# Patient Record
Sex: FEMALE | Race: WHITE
[De-identification: names, ages, dates, MRNs, and addresses within clinical notes are randomized per-mention and may not be internally consistent; named-entity substitution may affect disease eponyms.]

---

## 2017-02-05 NOTE — NUR
Patient given written and verbal discharge instructions and verbalizes 
understanding.  ER MD discussed with patient the results and treatment 
provided.  Patient in stable condition. ID arm band removed. IV catheter 
removed intact and dressing applied, no active bleeding.

Rx of TRAMADOL HCL 50 MG AMD FLAGYL 500 MG  given. Patient educated on pain 
management and to follow up with PMD. Pain Scale 0/10.

Opportunity for questions provided and answered.

## 2017-08-05 NOTE — NUR
Patient given written and verbal discharge instructions and verbalizes 
understanding.  ER MD discussed with patient the results and treatment 
provided. Patient in stable condition. ID arm band removed. IV catheter removed 
intact and dressing applied, no active bleeding.

Rx of Clonidine given. Patient educated on pain management and to follow up 
with PMD. Pain Scale 0/10.

Opportunity for questions provided and answered.

## 2017-08-05 NOTE — NUR
Pt AAOx4, ambulatory without assistance and steady gait. Pt states having 
chronic problem with blood pressure, states her blood pressure has not dropped 
over "last few days" despite taking medications for blood pressure prescribed 
by her PMD. Pt states having "slight blurry vision and having hot sensation 
throughout her body" for "several days prior" to ER visit. Pt denies any other 
complaints.

## 2018-09-24 NOTE — NUR
Pt presents to ED c/o tremors upon assessment. Pt has  no acute distress noted. 
/95. Pt seen in ED for elevated BP. Pt reports noncompliance w/ DM 
medication, abruptly stopping Klonopin and recent rotor cuff sx. Pt AAO X4 
appears anxious.Pt denies CP or SOB. Pt's tremors resolve with conversation.

## 2018-09-24 NOTE — NUR
Patient given written and verbal discharge instructions and verbalizes 
understanding.  ER MD discussed with patient the results and treatment 
provided. Patient in stable condition. ID arm band 

No Rx of given. Patient educated on pain management and to follow up with PMD. 
Pain Scale 0.

Opportunity for questions provided and answered. Medication side effect fact 
sheet provided. Dr. Martin aware of discharge BP. Pt advised to take home RX 
for HTN.

## 2019-06-03 NOTE — NUR
Pt AAOx4 ambulated into ED c/o 10/10 LLQ abd pain radiating to RLQ abd, nausea, 
vomiting, diarrhea since last night with new onset bright red rectal bleeding. 
Last oral intake was at lunch yesterday. No other injuries/complaints per 
pt/noted. Will continue to monitor.

## 2019-12-13 NOTE — NUR
Patient triaged and placed in waiting room. VSS and patient appears in no acute 
distress at this time. Accompanied by , awaiting available bed, and MD 
notified of need for MSE.

## 2019-12-13 NOTE — NUR
Patient given written and verbal discharge instructions and verbalizes 
understanding.  ER MD discussed with patient the results and treatment 
provided. Patient in stable condition. ID arm band removed. 

Patient educated on pain management and to follow up with PMD. Pain Scale 0/10.

Opportunity for questions provided and answered. Medication side effect fact 
sheet provided.

## 2020-03-15 ENCOUNTER — HOSPITAL ENCOUNTER (EMERGENCY)
Dept: HOSPITAL 4 - SED | Age: 70
Discharge: HOME | End: 2020-03-15
Payer: COMMERCIAL

## 2020-03-15 VITALS — HEIGHT: 66 IN | BODY MASS INDEX: 28.45 KG/M2 | SYSTOLIC BLOOD PRESSURE: 156 MMHG | WEIGHT: 177 LBS

## 2020-03-15 VITALS — SYSTOLIC BLOOD PRESSURE: 133 MMHG

## 2020-03-15 DIAGNOSIS — K57.92: Primary | ICD-10-CM

## 2020-03-15 DIAGNOSIS — K21.9: ICD-10-CM

## 2020-03-15 DIAGNOSIS — I10: ICD-10-CM

## 2020-03-15 DIAGNOSIS — E07.9: ICD-10-CM

## 2020-03-15 DIAGNOSIS — E11.9: ICD-10-CM

## 2020-03-15 DIAGNOSIS — Z88.2: ICD-10-CM

## 2020-03-15 DIAGNOSIS — Z88.0: ICD-10-CM

## 2020-03-15 DIAGNOSIS — Z79.82: ICD-10-CM

## 2020-03-15 DIAGNOSIS — Z79.899: ICD-10-CM

## 2020-03-15 DIAGNOSIS — Z88.1: ICD-10-CM

## 2020-03-15 LAB
ALBUMIN SERPL BCP-MCNC: 3.3 G/DL (ref 3.4–4.8)
ALT SERPL W P-5'-P-CCNC: 21 U/L (ref 12–78)
ANION GAP SERPL CALCULATED.3IONS-SCNC: 12 MMOL/L (ref 5–15)
APPEARANCE UR: CLEAR
AST SERPL W P-5'-P-CCNC: 12 U/L (ref 10–37)
BASOPHILS # BLD AUTO: 0 K/UL (ref 0–0.2)
BASOPHILS NFR BLD AUTO: 0.5 % (ref 0–2)
BILIRUB SERPL-MCNC: 0.5 MG/DL (ref 0–1)
BILIRUB UR QL STRIP: NEGATIVE
BUN SERPL-MCNC: 12 MG/DL (ref 8–21)
CALCIUM SERPL-MCNC: 9.2 MG/DL (ref 8.4–11)
CHLORIDE SERPL-SCNC: 101 MMOL/L (ref 98–107)
COLOR UR: YELLOW
CREAT SERPL-MCNC: 1 MG/DL (ref 0.55–1.3)
EOSINOPHIL # BLD AUTO: 0.1 K/UL (ref 0–0.4)
EOSINOPHIL NFR BLD AUTO: 0.8 % (ref 0–4)
ERYTHROCYTE [DISTWIDTH] IN BLOOD BY AUTOMATED COUNT: 13.2 % (ref 9–15)
GFR SERPL CREATININE-BSD FRML MDRD: 70 ML/MIN (ref 90–?)
GLUCOSE SERPL-MCNC: 225 MG/DL (ref 70–99)
GLUCOSE UR STRIP-MCNC: (no result) MG/DL
HCT VFR BLD AUTO: 40.6 % (ref 36–48)
HGB BLD-MCNC: 13.7 G/DL (ref 12–16)
HGB UR QL STRIP: NEGATIVE
KETONES UR STRIP-MCNC: NEGATIVE MG/DL
LEUKOCYTE ESTERASE UR QL STRIP: NEGATIVE
LIPASE SERPL-CCNC: 54 U/L (ref 73–393)
LYMPHOCYTES # BLD AUTO: 1 K/UL (ref 1–5.5)
LYMPHOCYTES NFR BLD AUTO: 11.6 % (ref 20.5–51.5)
MCH RBC QN AUTO: 30 PG (ref 27–31)
MCHC RBC AUTO-ENTMCNC: 34 % (ref 32–36)
MCV RBC AUTO: 88 FL (ref 79–98)
MONOCYTES # BLD MANUAL: 0.6 K/UL (ref 0–1)
MONOCYTES # BLD MANUAL: 6.7 % (ref 1.7–9.3)
NEUTROPHILS # BLD AUTO: 6.8 K/UL (ref 1.8–7.7)
NEUTROPHILS NFR BLD AUTO: 80.4 % (ref 40–70)
NITRITE UR QL STRIP: NEGATIVE
PH UR STRIP: 5 [PH] (ref 5–8)
PLATELET # BLD AUTO: 212 K/UL (ref 130–430)
POTASSIUM SERPL-SCNC: 3.5 MMOL/L (ref 3.5–5.1)
PROT UR QL STRIP: NEGATIVE
RBC # BLD AUTO: 4.61 MIL/UL (ref 4.2–6.2)
SODIUM SERPLBLD-SCNC: 138 MMOL/L (ref 136–145)
SP GR UR STRIP: 1.02 (ref 1–1.03)
UROBILINOGEN UR STRIP-MCNC: 0.2 MG/DL (ref 0.2–1)
WBC # BLD AUTO: 8.5 K/UL (ref 4.8–10.8)

## 2020-03-15 PROCEDURE — 96375 TX/PRO/DX INJ NEW DRUG ADDON: CPT

## 2020-03-15 PROCEDURE — 36415 COLL VENOUS BLD VENIPUNCTURE: CPT

## 2020-03-15 PROCEDURE — 99285 EMERGENCY DEPT VISIT HI MDM: CPT

## 2020-03-15 PROCEDURE — 81003 URINALYSIS AUTO W/O SCOPE: CPT

## 2020-03-15 PROCEDURE — 93005 ELECTROCARDIOGRAM TRACING: CPT

## 2020-03-15 PROCEDURE — 85025 COMPLETE CBC W/AUTO DIFF WBC: CPT

## 2020-03-15 PROCEDURE — 71045 X-RAY EXAM CHEST 1 VIEW: CPT

## 2020-03-15 PROCEDURE — 80053 COMPREHEN METABOLIC PANEL: CPT

## 2020-03-15 PROCEDURE — 83690 ASSAY OF LIPASE: CPT

## 2020-03-15 PROCEDURE — 74176 CT ABD & PELVIS W/O CONTRAST: CPT

## 2020-03-15 PROCEDURE — 96365 THER/PROPH/DIAG IV INF INIT: CPT

## 2020-04-03 ENCOUNTER — HOSPITAL ENCOUNTER (EMERGENCY)
Dept: HOSPITAL 4 - SED | Age: 70
Discharge: HOME | End: 2020-04-03
Payer: COMMERCIAL

## 2020-04-03 VITALS — HEIGHT: 66 IN | BODY MASS INDEX: 27.32 KG/M2 | WEIGHT: 170 LBS

## 2020-04-03 VITALS — SYSTOLIC BLOOD PRESSURE: 205 MMHG

## 2020-04-03 VITALS — SYSTOLIC BLOOD PRESSURE: 127 MMHG

## 2020-04-03 DIAGNOSIS — E07.9: ICD-10-CM

## 2020-04-03 DIAGNOSIS — Z88.0: ICD-10-CM

## 2020-04-03 DIAGNOSIS — I10: Primary | ICD-10-CM

## 2020-04-03 DIAGNOSIS — F41.9: ICD-10-CM

## 2020-04-03 DIAGNOSIS — E11.9: ICD-10-CM

## 2020-04-03 DIAGNOSIS — Z90.710: ICD-10-CM

## 2020-04-03 DIAGNOSIS — Z79.899: ICD-10-CM

## 2020-04-03 DIAGNOSIS — Z79.82: ICD-10-CM

## 2020-04-03 DIAGNOSIS — Z90.49: ICD-10-CM

## 2020-04-03 DIAGNOSIS — Z88.2: ICD-10-CM

## 2020-04-03 DIAGNOSIS — R51: ICD-10-CM

## 2020-04-03 DIAGNOSIS — K21.9: ICD-10-CM

## 2020-04-03 DIAGNOSIS — Z88.1: ICD-10-CM

## 2020-04-03 LAB
ALBUMIN SERPL BCP-MCNC: 3 G/DL (ref 3.4–4.8)
ALT SERPL W P-5'-P-CCNC: 17 U/L (ref 12–78)
ANION GAP SERPL CALCULATED.3IONS-SCNC: 10 MMOL/L (ref 5–15)
AST SERPL W P-5'-P-CCNC: 7 U/L (ref 10–37)
BASOPHILS # BLD AUTO: 0.1 K/UL (ref 0–0.2)
BASOPHILS NFR BLD AUTO: 1 % (ref 0–2)
BILIRUB SERPL-MCNC: 0.2 MG/DL (ref 0–1)
BUN SERPL-MCNC: 14 MG/DL (ref 8–21)
CALCIUM SERPL-MCNC: 8 MG/DL (ref 8.4–11)
CHLORIDE SERPL-SCNC: 104 MMOL/L (ref 98–107)
CREAT SERPL-MCNC: 0.94 MG/DL (ref 0.55–1.3)
EOSINOPHIL # BLD AUTO: 0.1 K/UL (ref 0–0.4)
EOSINOPHIL NFR BLD AUTO: 1.3 % (ref 0–4)
ERYTHROCYTE [DISTWIDTH] IN BLOOD BY AUTOMATED COUNT: 13.4 % (ref 9–15)
GFR SERPL CREATININE-BSD FRML MDRD: 76 ML/MIN (ref 90–?)
GLUCOSE SERPL-MCNC: 282 MG/DL (ref 70–99)
HCT VFR BLD AUTO: 37.2 % (ref 36–48)
HGB BLD-MCNC: 12.5 G/DL (ref 12–16)
LYMPHOCYTES # BLD AUTO: 1.2 K/UL (ref 1–5.5)
LYMPHOCYTES NFR BLD AUTO: 18.9 % (ref 20.5–51.5)
MCH RBC QN AUTO: 30 PG (ref 27–31)
MCHC RBC AUTO-ENTMCNC: 34 % (ref 32–36)
MCV RBC AUTO: 89 FL (ref 79–98)
MONOCYTES # BLD MANUAL: 0.5 K/UL (ref 0–1)
MONOCYTES # BLD MANUAL: 8.1 % (ref 1.7–9.3)
NEUTROPHILS # BLD AUTO: 4.5 K/UL (ref 1.8–7.7)
NEUTROPHILS NFR BLD AUTO: 70.7 % (ref 40–70)
PLATELET # BLD AUTO: 278 K/UL (ref 130–430)
POTASSIUM SERPL-SCNC: 3.7 MMOL/L (ref 3.5–5.1)
RBC # BLD AUTO: 4.2 MIL/UL (ref 4.2–6.2)
SODIUM SERPLBLD-SCNC: 138 MMOL/L (ref 136–145)
WBC # BLD AUTO: 6.3 K/UL (ref 4.8–10.8)

## 2020-04-03 PROCEDURE — 80053 COMPREHEN METABOLIC PANEL: CPT

## 2020-04-03 PROCEDURE — 82962 GLUCOSE BLOOD TEST: CPT

## 2020-04-03 PROCEDURE — 96374 THER/PROPH/DIAG INJ IV PUSH: CPT

## 2020-04-03 PROCEDURE — 84484 ASSAY OF TROPONIN QUANT: CPT

## 2020-04-03 PROCEDURE — 93005 ELECTROCARDIOGRAM TRACING: CPT

## 2020-04-03 PROCEDURE — 96375 TX/PRO/DX INJ NEW DRUG ADDON: CPT

## 2020-04-03 PROCEDURE — 70450 CT HEAD/BRAIN W/O DYE: CPT

## 2020-04-03 PROCEDURE — 85025 COMPLETE CBC W/AUTO DIFF WBC: CPT

## 2020-04-03 PROCEDURE — 99285 EMERGENCY DEPT VISIT HI MDM: CPT

## 2020-04-03 PROCEDURE — 36415 COLL VENOUS BLD VENIPUNCTURE: CPT

## 2020-04-04 ENCOUNTER — HOSPITAL ENCOUNTER (EMERGENCY)
Dept: HOSPITAL 4 - SED | Age: 70
Discharge: HOME | End: 2020-04-04
Payer: COMMERCIAL

## 2020-04-04 VITALS — WEIGHT: 175 LBS | BODY MASS INDEX: 28.12 KG/M2 | SYSTOLIC BLOOD PRESSURE: 171 MMHG | HEIGHT: 66 IN

## 2020-04-04 VITALS — SYSTOLIC BLOOD PRESSURE: 156 MMHG

## 2020-04-04 DIAGNOSIS — Z79.899: ICD-10-CM

## 2020-04-04 DIAGNOSIS — E11.9: ICD-10-CM

## 2020-04-04 DIAGNOSIS — Z79.4: ICD-10-CM

## 2020-04-04 DIAGNOSIS — K21.9: ICD-10-CM

## 2020-04-04 DIAGNOSIS — R11.0: ICD-10-CM

## 2020-04-04 DIAGNOSIS — I10: ICD-10-CM

## 2020-04-04 DIAGNOSIS — Z88.2: ICD-10-CM

## 2020-04-04 DIAGNOSIS — R51: Primary | ICD-10-CM

## 2020-04-04 DIAGNOSIS — Z88.0: ICD-10-CM

## 2020-04-04 DIAGNOSIS — Z88.1: ICD-10-CM

## 2020-04-04 LAB
ALBUMIN SERPL BCP-MCNC: 3.6 G/DL (ref 3.4–4.8)
ALT SERPL W P-5'-P-CCNC: 20 U/L (ref 12–78)
ANION GAP SERPL CALCULATED.3IONS-SCNC: 11 MMOL/L (ref 5–15)
APPEARANCE UR: CLEAR
AST SERPL W P-5'-P-CCNC: 12 U/L (ref 10–37)
BACTERIA URNS QL MICRO: (no result) /HPF
BASOPHILS # BLD AUTO: 0.1 K/UL (ref 0–0.2)
BASOPHILS NFR BLD AUTO: 0.6 % (ref 0–2)
BILIRUB SERPL-MCNC: 0.4 MG/DL (ref 0–1)
BILIRUB UR QL STRIP: NEGATIVE
BUN SERPL-MCNC: 14 MG/DL (ref 8–21)
CALCIUM SERPL-MCNC: 9.3 MG/DL (ref 8.4–11)
CHLORIDE SERPL-SCNC: 96 MMOL/L (ref 98–107)
COLOR UR: YELLOW
CREAT SERPL-MCNC: 0.94 MG/DL (ref 0.55–1.3)
EOSINOPHIL # BLD AUTO: 0.1 K/UL (ref 0–0.4)
EOSINOPHIL NFR BLD AUTO: 1.1 % (ref 0–4)
ERYTHROCYTE [DISTWIDTH] IN BLOOD BY AUTOMATED COUNT: 13.4 % (ref 9–15)
GFR SERPL CREATININE-BSD FRML MDRD: 76 ML/MIN (ref 90–?)
GLUCOSE SERPL-MCNC: 269 MG/DL (ref 70–99)
GLUCOSE UR STRIP-MCNC: (no result) MG/DL
HCT VFR BLD AUTO: 41.4 % (ref 36–48)
HGB BLD-MCNC: 14 G/DL (ref 12–16)
HGB UR QL STRIP: NEGATIVE
INR PPP: 0.9 (ref 0.8–1.2)
KETONES UR STRIP-MCNC: NEGATIVE MG/DL
LEUKOCYTE ESTERASE UR QL STRIP: NEGATIVE
LYMPHOCYTES # BLD AUTO: 1.3 K/UL (ref 1–5.5)
LYMPHOCYTES NFR BLD AUTO: 12.2 % (ref 20.5–51.5)
MCH RBC QN AUTO: 30 PG (ref 27–31)
MCHC RBC AUTO-ENTMCNC: 34 % (ref 32–36)
MCV RBC AUTO: 88 FL (ref 79–98)
MONOCYTES # BLD MANUAL: 0.8 K/UL (ref 0–1)
MONOCYTES # BLD MANUAL: 7.6 % (ref 1.7–9.3)
NEUTROPHILS # BLD AUTO: 8.1 K/UL (ref 1.8–7.7)
NEUTROPHILS NFR BLD AUTO: 78.5 % (ref 40–70)
NITRITE UR QL STRIP: NEGATIVE
PH UR STRIP: 5 [PH] (ref 5–8)
PLATELET # BLD AUTO: 313 K/UL (ref 130–430)
POTASSIUM SERPL-SCNC: 3.3 MMOL/L (ref 3.5–5.1)
PROT UR QL STRIP: (no result)
PROTHROMBIN TIME: 9 SECS (ref 9.5–12.5)
RBC # BLD AUTO: 4.71 MIL/UL (ref 4.2–6.2)
RBC #/AREA URNS HPF: (no result) /HPF (ref 0–3)
SODIUM SERPLBLD-SCNC: 130 MMOL/L (ref 136–145)
SP GR UR STRIP: 1.02 (ref 1–1.03)
T4 FREE SERPL-MCNC: 0.8 NG/DL (ref 0.6–1.6)
TSH SERPL DL<=0.05 MIU/L-ACNC: 2.01 UIU/ML (ref 0.34–4.82)
UROBILINOGEN UR STRIP-MCNC: 0.2 MG/DL (ref 0.2–1)
WBC # BLD AUTO: 10.4 K/UL (ref 4.8–10.8)
WBC #/AREA URNS HPF: (no result) /HPF (ref 0–3)

## 2020-04-04 PROCEDURE — 93005 ELECTROCARDIOGRAM TRACING: CPT

## 2020-04-04 PROCEDURE — 71045 X-RAY EXAM CHEST 1 VIEW: CPT

## 2020-04-04 PROCEDURE — 84443 ASSAY THYROID STIM HORMONE: CPT

## 2020-04-04 PROCEDURE — 85651 RBC SED RATE NONAUTOMATED: CPT

## 2020-04-04 PROCEDURE — 85730 THROMBOPLASTIN TIME PARTIAL: CPT

## 2020-04-04 PROCEDURE — 83605 ASSAY OF LACTIC ACID: CPT

## 2020-04-04 PROCEDURE — 84484 ASSAY OF TROPONIN QUANT: CPT

## 2020-04-04 PROCEDURE — 85025 COMPLETE CBC W/AUTO DIFF WBC: CPT

## 2020-04-04 PROCEDURE — 36415 COLL VENOUS BLD VENIPUNCTURE: CPT

## 2020-04-04 PROCEDURE — 81000 URINALYSIS NONAUTO W/SCOPE: CPT

## 2020-04-04 PROCEDURE — 84439 ASSAY OF FREE THYROXINE: CPT

## 2020-04-04 PROCEDURE — 85610 PROTHROMBIN TIME: CPT

## 2020-04-04 PROCEDURE — 99285 EMERGENCY DEPT VISIT HI MDM: CPT

## 2020-04-04 PROCEDURE — 80053 COMPREHEN METABOLIC PANEL: CPT

## 2020-04-04 NOTE — NUR
Patient given written and verbal discharge instructions and verbalizes 
understanding.  ER MD discussed with patient the results and treatment 
provided. Patient in stable condition. ID arm band removed. Rx of ZOFRAN given. 
Patient educated on pain management and to follow up with PMD. Pain Scale 0/10 
Opportunity for questions provided and answered. Medication side effect fact 
sheet provided.

## 2020-04-04 NOTE — NUR
Pt BIB family to ED C/O headache, nausea and generalized weakness.  She has had 
these symptoms for the past 2-3 days. She had spoken to her PCP when her 
symptoms first began, and her antihypertensive/cardiac medications were changed 
to carvedilol and Benicar.  Her symptoms persisted thereafter, and she 
presented to the emergency department earlier today for the symptoms as well as 
elevated blood pressure.  Patient was treated with an antihypertensive agent 
and an analgesic, and discharged.  Because her symptoms persisted, she has 
returned to the ED.

## 2022-11-07 NOTE — NUR
FEELING BETTER AND  REQUESTING TO GO HOME. STATED

 AWAITS DISCHARGE.
MD THOMPSON AT BEDSIDE TO ASSESS. PT IS CALM, ALERT
MEDICATED WITH MORPHINE. TOLERATED WELL, IV SITE CLEAR.

RESP UNLABORED, SKIN WARM AND DRY/GOOD COLOR
Patient given written and verbal discharge instructions and verbalizes 
understanding.  ER MD discussed with patient the results and treatment 
provided. Patient in stable condition. ID arm band removed. 

No Rx given. Patient educated on pain management and to follow up with PMD. 
Pain Scale 0/10.

Opportunity for questions provided and answered. Medication side effect fact 
sheet provided.
Patient to ER with [facility] EMT  to gown for evaluation.
RECEIVED REPORT, SHE IS HERE FOR HA AND HTN. SHE HAS HAD PALOMO FOR "FEW DAYS."

BIB HER  FROM HOME AFTER INCREASE IN SEVERITY OF HA WITH NO RELIEF WITH 
HER USUAL MEDICATIONS IE. EXCEDRINE/NORCO.

AMBULATES STEADY, SKIN WARM AND DRY. COMMUNICATES CLEARLY IN FULL COMPLETE 
SENTENCES.

RESP UNLABORED, DENIES CP/SOB. DENIES N/V/D. IV HL 20 GUAGE LT AC. 

DENIES VISUAL DISTURBANCES, NO NEURO DEFECITS.
Health Care Proxy (HCP)

## 2022-12-28 ENCOUNTER — HOSPITAL ENCOUNTER (INPATIENT)
Dept: HOSPITAL 4 - SED | Age: 72
LOS: 1 days | Discharge: LEFT BEFORE DISCHARGE | DRG: 74 | End: 2022-12-29
Attending: FAMILY MEDICINE | Admitting: FAMILY MEDICINE
Payer: COMMERCIAL

## 2022-12-28 ENCOUNTER — HOSPITAL ENCOUNTER (EMERGENCY)
Dept: HOSPITAL 4 - SED | Age: 72
Discharge: HOME | End: 2022-12-28
Payer: COMMERCIAL

## 2022-12-28 VITALS — SYSTOLIC BLOOD PRESSURE: 142 MMHG

## 2022-12-28 VITALS — SYSTOLIC BLOOD PRESSURE: 166 MMHG

## 2022-12-28 VITALS — BODY MASS INDEX: 29.73 KG/M2 | HEIGHT: 66 IN | WEIGHT: 185 LBS

## 2022-12-28 VITALS — BODY MASS INDEX: 30.05 KG/M2 | HEIGHT: 66 IN | WEIGHT: 187 LBS

## 2022-12-28 VITALS — SYSTOLIC BLOOD PRESSURE: 131 MMHG

## 2022-12-28 VITALS — SYSTOLIC BLOOD PRESSURE: 141 MMHG

## 2022-12-28 DIAGNOSIS — G90.8: Primary | ICD-10-CM

## 2022-12-28 DIAGNOSIS — R42: ICD-10-CM

## 2022-12-28 DIAGNOSIS — E11.65: ICD-10-CM

## 2022-12-28 DIAGNOSIS — Z88.2: ICD-10-CM

## 2022-12-28 DIAGNOSIS — Z79.899: ICD-10-CM

## 2022-12-28 DIAGNOSIS — K21.9: ICD-10-CM

## 2022-12-28 DIAGNOSIS — I10: ICD-10-CM

## 2022-12-28 DIAGNOSIS — Z88.0: ICD-10-CM

## 2022-12-28 DIAGNOSIS — K57.90: ICD-10-CM

## 2022-12-28 DIAGNOSIS — Z20.822: ICD-10-CM

## 2022-12-28 DIAGNOSIS — E03.9: ICD-10-CM

## 2022-12-28 DIAGNOSIS — E11.9: ICD-10-CM

## 2022-12-28 DIAGNOSIS — Z79.4: ICD-10-CM

## 2022-12-28 DIAGNOSIS — Z88.8: ICD-10-CM

## 2022-12-28 DIAGNOSIS — E78.5: ICD-10-CM

## 2022-12-28 DIAGNOSIS — F41.9: ICD-10-CM

## 2022-12-28 DIAGNOSIS — Z79.82: ICD-10-CM

## 2022-12-28 DIAGNOSIS — K29.70: Primary | ICD-10-CM

## 2022-12-28 DIAGNOSIS — Z88.1: ICD-10-CM

## 2022-12-28 LAB
ACETONE EXG QL: NEGATIVE
ALBUMIN SERPL BCP-MCNC: 3.1 G/DL (ref 3.4–4.8)
ALBUMIN SERPL BCP-MCNC: 3.6 G/DL (ref 3.4–4.8)
ALT SERPL W P-5'-P-CCNC: 20 U/L (ref 12–78)
ALT SERPL W P-5'-P-CCNC: 23 U/L (ref 12–78)
AMYLASE SERPL-CCNC: 30 U/L (ref 0–100)
ANION GAP SERPL CALCULATED.3IONS-SCNC: 7 MMOL/L (ref 5–15)
ANION GAP SERPL CALCULATED.3IONS-SCNC: 7 MMOL/L (ref 5–15)
APPEARANCE UR: (no result)
APPEARANCE UR: CLEAR
AST SERPL W P-5'-P-CCNC: 14 U/L (ref 10–37)
AST SERPL W P-5'-P-CCNC: 17 U/L (ref 10–37)
BACTERIA URNS QL MICRO: (no result) /HPF
BACTERIA URNS QL MICRO: (no result) /HPF
BASOPHILS # BLD AUTO: 0.1 K/UL (ref 0–0.2)
BASOPHILS # BLD AUTO: 0.1 K/UL (ref 0–0.2)
BASOPHILS NFR BLD AUTO: 0.6 % (ref 0–2)
BASOPHILS NFR BLD AUTO: 0.7 % (ref 0–2)
BILIRUB SERPL-MCNC: 0.3 MG/DL (ref 0–1)
BILIRUB SERPL-MCNC: 0.3 MG/DL (ref 0–1)
BILIRUB UR QL STRIP: NEGATIVE
BILIRUB UR QL STRIP: NEGATIVE
BUN SERPL-MCNC: 11 MG/DL (ref 8–21)
BUN SERPL-MCNC: 11 MG/DL (ref 8–21)
CALCIUM SERPL-MCNC: 9.2 MG/DL (ref 8.4–11)
CALCIUM SERPL-MCNC: 9.6 MG/DL (ref 8.4–11)
CHLORIDE SERPL-SCNC: 100 MMOL/L (ref 98–107)
CHLORIDE SERPL-SCNC: 102 MMOL/L (ref 98–107)
COLOR UR: YELLOW
COLOR UR: YELLOW
CREAT SERPL-MCNC: 1.02 MG/DL (ref 0.55–1.3)
CREAT SERPL-MCNC: 1.09 MG/DL (ref 0.55–1.3)
EOSINOPHIL # BLD AUTO: 0 K/UL (ref 0–0.4)
EOSINOPHIL # BLD AUTO: 0 K/UL (ref 0–0.4)
EOSINOPHIL NFR BLD AUTO: 0 % (ref 0–4)
EOSINOPHIL NFR BLD AUTO: 0.1 % (ref 0–4)
ERYTHROCYTE [DISTWIDTH] IN BLOOD BY AUTOMATED COUNT: 13.2 % (ref 9–15)
ERYTHROCYTE [DISTWIDTH] IN BLOOD BY AUTOMATED COUNT: 13.3 % (ref 9–15)
GFR SERPL CREATININE-BSD FRML MDRD: (no result) ML/MIN (ref 90–?)
GFR SERPL CREATININE-BSD FRML MDRD: (no result) ML/MIN (ref 90–?)
GLUCOSE SERPL-MCNC: 216 MG/DL (ref 70–99)
GLUCOSE SERPL-MCNC: 228 MG/DL (ref 70–99)
GLUCOSE UR STRIP-MCNC: (no result) MG/DL
GLUCOSE UR STRIP-MCNC: (no result) MG/DL
HCT VFR BLD AUTO: 38.3 % (ref 36–48)
HCT VFR BLD AUTO: 41 % (ref 36–48)
HGB BLD-MCNC: 13 G/DL (ref 12–16)
HGB BLD-MCNC: 13.9 G/DL (ref 12–16)
HGB UR QL STRIP: (no result)
HGB UR QL STRIP: (no result)
INR PPP: 0.9 (ref 0.8–1.2)
KETONES UR STRIP-MCNC: (no result) MG/DL
KETONES UR STRIP-MCNC: (no result) MG/DL
LEUKOCYTE ESTERASE UR QL STRIP: NEGATIVE
LEUKOCYTE ESTERASE UR QL STRIP: NEGATIVE
LIPASE SERPL-CCNC: 55 U/L (ref 73–393)
LYMPHOCYTES # BLD AUTO: 0.6 K/UL (ref 1–5.5)
LYMPHOCYTES # BLD AUTO: 0.7 K/UL (ref 1–5.5)
LYMPHOCYTES NFR BLD AUTO: 6.2 % (ref 20.5–51.5)
LYMPHOCYTES NFR BLD AUTO: 6.5 % (ref 20.5–51.5)
MCH RBC QN AUTO: 29 PG (ref 27–31)
MCH RBC QN AUTO: 29 PG (ref 27–31)
MCHC RBC AUTO-ENTMCNC: 34 % (ref 32–36)
MCHC RBC AUTO-ENTMCNC: 34 % (ref 32–36)
MCV RBC AUTO: 85 FL (ref 79–98)
MCV RBC AUTO: 86 FL (ref 79–98)
MONOCYTES # BLD MANUAL: 0.7 K/UL (ref 0–1)
MONOCYTES # BLD MANUAL: 0.7 K/UL (ref 0–1)
MONOCYTES # BLD MANUAL: 6.7 % (ref 1.7–9.3)
MONOCYTES # BLD MANUAL: 7.9 % (ref 1.7–9.3)
MUCOUS THREADS URNS QL MICRO: (no result) /LPF
MUCOUS THREADS URNS QL MICRO: (no result) /LPF
NEUTROPHILS # BLD AUTO: 7.9 K/UL (ref 1.8–7.7)
NEUTROPHILS # BLD AUTO: 9.2 K/UL (ref 1.8–7.7)
NEUTROPHILS NFR BLD AUTO: 85.2 % (ref 40–70)
NEUTROPHILS NFR BLD AUTO: 86.1 % (ref 40–70)
NITRITE UR QL STRIP: NEGATIVE
NITRITE UR QL STRIP: NEGATIVE
PH UR STRIP: 6 [PH] (ref 5–8)
PH UR STRIP: 7 [PH] (ref 5–8)
PLATELET # BLD AUTO: 223 K/UL (ref 130–430)
PLATELET # BLD AUTO: 237 K/UL (ref 130–430)
PROT UR QL STRIP: (no result)
PROT UR QL STRIP: NEGATIVE
PROTHROMBIN TIME: 9.7 SECS (ref 9.5–12.5)
RBC # BLD AUTO: 4.51 MIL/UL (ref 4.2–6.2)
RBC # BLD AUTO: 4.77 MIL/UL (ref 4.2–6.2)
RBC #/AREA URNS HPF: (no result) /HPF (ref 0–3)
RBC #/AREA URNS HPF: (no result) /HPF (ref 0–3)
SP GR UR STRIP: 1.01 (ref 1–1.03)
SP GR UR STRIP: 1.01 (ref 1–1.03)
UROBILINOGEN UR STRIP-MCNC: 0.2 MG/DL (ref 0.2–1)
UROBILINOGEN UR STRIP-MCNC: 0.2 MG/DL (ref 0.2–1)
WBC # BLD AUTO: 10.6 K/UL (ref 4.8–10.8)
WBC # BLD AUTO: 9.2 K/UL (ref 4.8–10.8)
WBC #/AREA URNS HPF: (no result) /HPF (ref 0–3)
WBC #/AREA URNS HPF: (no result) /HPF (ref 0–3)

## 2022-12-28 PROCEDURE — 80053 COMPREHEN METABOLIC PANEL: CPT

## 2022-12-28 PROCEDURE — 86900 BLOOD TYPING SEROLOGIC ABO: CPT

## 2022-12-28 PROCEDURE — 87426 SARSCOV CORONAVIRUS AG IA: CPT

## 2022-12-28 PROCEDURE — 82150 ASSAY OF AMYLASE: CPT

## 2022-12-28 PROCEDURE — 85610 PROTHROMBIN TIME: CPT

## 2022-12-28 PROCEDURE — 96374 THER/PROPH/DIAG INJ IV PUSH: CPT

## 2022-12-28 PROCEDURE — 76376 3D RENDER W/INTRP POSTPROCES: CPT

## 2022-12-28 PROCEDURE — 96375 TX/PRO/DX INJ NEW DRUG ADDON: CPT

## 2022-12-28 PROCEDURE — 99285 EMERGENCY DEPT VISIT HI MDM: CPT

## 2022-12-28 PROCEDURE — 86901 BLOOD TYPING SEROLOGIC RH(D): CPT

## 2022-12-28 PROCEDURE — 74176 CT ABD & PELVIS W/O CONTRAST: CPT

## 2022-12-28 PROCEDURE — 83690 ASSAY OF LIPASE: CPT

## 2022-12-28 PROCEDURE — 87804 INFLUENZA ASSAY W/OPTIC: CPT

## 2022-12-28 PROCEDURE — 83605 ASSAY OF LACTIC ACID: CPT

## 2022-12-28 PROCEDURE — 84484 ASSAY OF TROPONIN QUANT: CPT

## 2022-12-28 PROCEDURE — G0378 HOSPITAL OBSERVATION PER HR: HCPCS

## 2022-12-28 PROCEDURE — 81000 URINALYSIS NONAUTO W/SCOPE: CPT

## 2022-12-28 PROCEDURE — 85730 THROMBOPLASTIN TIME PARTIAL: CPT

## 2022-12-28 PROCEDURE — 70450 CT HEAD/BRAIN W/O DYE: CPT

## 2022-12-28 PROCEDURE — 93005 ELECTROCARDIOGRAM TRACING: CPT

## 2022-12-28 PROCEDURE — 36415 COLL VENOUS BLD VENIPUNCTURE: CPT

## 2022-12-28 PROCEDURE — 86886 COOMBS TEST INDIRECT TITER: CPT

## 2022-12-28 PROCEDURE — 71045 X-RAY EXAM CHEST 1 VIEW: CPT

## 2022-12-28 PROCEDURE — 85025 COMPLETE CBC W/AUTO DIFF WBC: CPT

## 2022-12-28 RX ADMIN — HYDROCODONE BITARTRATE AND ACETAMINOPHEN PRN TAB: 5; 325 TABLET ORAL at 22:26

## 2022-12-28 NOTE — NUR
PT A&O 4, ABULATORY AND FOLLOWING COMMANDS. EVEN AND UNLABORED RESP. SAFETY 
PRECAUTIONS IN PLACE AND CONNECTED TO MONITOR.

## 2022-12-28 NOTE — NUR
PER Pt request contacted Stalin: [phone] left voicemail message to bring pt a 
cell phone for communication.

## 2022-12-28 NOTE — NUR
BROUGHT IN BY ELLEN GALEANA, PT WAS JUST RELEASED FROM ER, CALLED PT AND TOLD 
HER SPOUSE SHE IS + FOR COVID. PTS SPOUSE STATES THAT PT IS UNRESPONSIVE AND 
THAT HE WAS GOING TO CALL 911. PT RETURNED TO ER FOR NAUSEA AND COVID SYMPTOMS

## 2022-12-28 NOTE — NUR
Admission Note

Received patient from ER with diagnosis of syncope and covid positive. Initial Plan of Care 
discussed-patient verbalized understanding. Oriented to room, call light, pain management 
and safety.

## 2022-12-28 NOTE — NUR
Patient will be admitted to care of DR. PINON.  Admitted to TELEMETRY unit.  
Will go to room 115.  Belongings list completed.  Complete and up to date 
summary report printed. SBAR report given to Malik MILLS at bedside with 
opportunity for questions.

## 2022-12-28 NOTE — NUR
16 #  FR In and Out catheter with use of sterile technique. Immediate return of 
 100 ml  urine noted.  Urine sample collected and sent to lab. Pt tolerated 
procedure .

Patient unable to toilet self.

## 2022-12-28 NOTE — NUR
Patient given written and verbal discharge instructions and verbalizes 
understanding.  ER MD discussed with patient the results and treatment 
provided. Patient in stable condition. ID arm band removed. IV catheter removed 
intact and dressing applied, no active bleeding.

Rx of NORCO, OMEPRAZOLE, ZOFRAN given. Patient educated on pain management and 
to follow up with PMD. Pain Scale 0/10.

Opportunity for questions provided and answered. Medication side effect fact 
sheet provided.

## 2022-12-28 NOTE — NUR
Admit bed requested 

Patient will be admitted to care of Dr Lake..  

Admitted to Tele unit.

Diagnosis  SYNCOPE, COVID +

Inpatient (Yes or No)  YES

Observation (Yes or No) NO

Orientation concerns or request close to nursing station  (Yes or No) NO 

Covid Status POSITIVE 

On vent or bipap N/A 

Isolation requirements N/A 

Needs a sitter N/A 

From Home (Yes or if No enter name of facility) YES

Requires Dialysis (Yes or No)  N/A 

Med Rec Completed (Yes of No)

## 2022-12-28 NOTE — NUR
Grimes of care received, pt A&Ox4, pt presents to ER with nausea, weakness 
and cough , pt positive for covid, skin pink and warm, cap refill <3, VSS, 
respirations even and unlabored, will cont to monitor.

## 2022-12-28 NOTE — NUR
-------------------------------------------------------------------------------

           *** Note anupam in EDM - 12/28/22 at 1910 by DANIELLE ***            

-------------------------------------------------------------------------------

16 #  FR Cadena catheter with use of sterile technique. Immediate return of  200 
cc  urine noted.  Bedside drainage bag placed below level of bladder.  Urine 
sample collected and sent to lab.  Pt tolerated procedure .

Patient unable to toilet self.

## 2022-12-29 VITALS — SYSTOLIC BLOOD PRESSURE: 128 MMHG

## 2022-12-29 VITALS — SYSTOLIC BLOOD PRESSURE: 145 MMHG

## 2022-12-29 VITALS — SYSTOLIC BLOOD PRESSURE: 136 MMHG

## 2022-12-29 RX ADMIN — HYDROCODONE BITARTRATE AND ACETAMINOPHEN PRN TAB: 5; 325 TABLET ORAL at 06:37

## 2022-12-29 NOTE — NUR
1100: PATIENT REQUESTED TO LEAVE AMA, STATING THAT "I FEEL BETTER, AND I WANT TO GO HOME, 
AND FOLLOW UP WITH MY DOCTOR". THIS WRITER(PRIMARY RN) EXPLAINED TO PATIENT THE CONSEQUENCES 
OF LEAVING THE HOSPITAL AMA INCLUDING, BUT NOT LIMITED TO RECURRENCE OF SYMPTOMS, AND EVEN 
DEATH. PATIENT VERBALIZES UNDERSTANDING, AND SIGNED AMA FORM.

## 2022-12-29 NOTE — NUR
CONSULTATION PAGED

REASON FOR CONSULTATION COVID POSITIVE

WAS CONSULT CALED?Y

PERSON WHO WAS NOTIFIED:YONI

CONSULTING PHYSICIAN:KRISHNA FOSTER

CONSULTANT SPECIALTY:ID

CONSULTANT PHONE NUMBER:[phone]

REQUESTING PHYSICIAN:JUAN WEIR

## 2022-12-29 NOTE — NUR
CLOSING NOTE

PATIENT IN BED, AWAKE, ALERT, NO S/S OF ACUTE DISTRESS. BREATHING EVEN AND UNLABORED. IV 
SITE PATENT, NO SIGNS OF INFILTRATION OR INFECTION NOTED. SKIN WARM AND DRY TO TOUCH. ALL 
NEEDS MET THROUGHOUT SHIFT. FALL, SAFETY, ISOLATION PRECAUTIONS MAINTAINED THROUGHOUT SHIFT. 
WILL CONTINUE TO MONITOR UNTIL PATIENT CARE IS ENDORSED TO ONCOMING DAYSHIFT NURSE.

## 2022-12-29 NOTE — NUR
ROUND

ASSISTED TO BATHROOM. PATIENT WALKED ON HER OWN, NO ASSISTANCE. GAIT STEADY. ALL NEEDS MET. 
WILL MONITOR.

## 2022-12-29 NOTE — NUR
Consultation Paged





Reason for Consultation: Syncope, Covid +

Was consult called: Y

Person who was notified: Dr. Johnson through text message

Consulting Physician: Dr. Ali, Mohsen

Ordering Physician: Leon Callahan

## 2022-12-29 NOTE — NUR
Consultation Paged





Reason for Consultation: Syncope, Covid +

Was consult called: Y

Person who was notified: Shahbaz Callahan through text message

Consulting Physician: Shahbaz Callahan

Ordering Physician: Leon Callahan

## 2022-12-29 NOTE — NUR
CONSULTATION PAGED/CALLED

Reason for Consultation: []  COVID POSITIVE

Person Who was Notified: []  KIRK

Consulting Physician: []   REESE VEGA

Consultant Specialty: []  ID

Ordering Physician: []  DR LARA PINON